# Patient Record
Sex: MALE | Race: BLACK OR AFRICAN AMERICAN
[De-identification: names, ages, dates, MRNs, and addresses within clinical notes are randomized per-mention and may not be internally consistent; named-entity substitution may affect disease eponyms.]

---

## 2018-06-06 ENCOUNTER — HOSPITAL ENCOUNTER (EMERGENCY)
Dept: HOSPITAL 56 - MW.ED | Age: 28
Discharge: HOME | End: 2018-06-06
Payer: COMMERCIAL

## 2018-06-06 DIAGNOSIS — F17.210: ICD-10-CM

## 2018-06-06 DIAGNOSIS — E86.0: ICD-10-CM

## 2018-06-06 DIAGNOSIS — H66.92: Primary | ICD-10-CM

## 2018-06-06 LAB
CHLORIDE SERPL-SCNC: 96 MMOL/L (ref 98–107)
SODIUM SERPL-SCNC: 132 MMOL/L (ref 136–148)

## 2018-06-06 PROCEDURE — 96361 HYDRATE IV INFUSION ADD-ON: CPT

## 2018-06-06 PROCEDURE — 80053 COMPREHEN METABOLIC PANEL: CPT

## 2018-06-06 PROCEDURE — 82150 ASSAY OF AMYLASE: CPT

## 2018-06-06 PROCEDURE — 36415 COLL VENOUS BLD VENIPUNCTURE: CPT

## 2018-06-06 PROCEDURE — 85025 COMPLETE CBC W/AUTO DIFF WBC: CPT

## 2018-06-06 PROCEDURE — 83690 ASSAY OF LIPASE: CPT

## 2018-06-06 PROCEDURE — 96374 THER/PROPH/DIAG INJ IV PUSH: CPT

## 2018-06-06 PROCEDURE — 99283 EMERGENCY DEPT VISIT LOW MDM: CPT

## 2018-06-06 PROCEDURE — 81003 URINALYSIS AUTO W/O SCOPE: CPT

## 2018-06-06 NOTE — EDM.PDOC
ED HPI GENERAL MEDICAL PROBLEM





- General


Chief Complaint: General


Stated Complaint: DEHYDRATION, HIGH BLOOD PRESSURE


Time Seen by Provider: 18 14:05


Source of Information: Reports: Patient


History Limitations: Reports: No Limitations





- History of Present Illness


INITIAL COMMENTS - FREE TEXT/NARRATIVE: 





HISTORY AND PHYSICAL:





History of present illness:


[Patient comes to the emergency room complaining of left ear pain, headache and 

generalized malaise for the past 3 days. He had a cold 6 days ago that he felt 

was improving until his symptoms began to worsen 3 days ago. He is complaining 

of fever, blurred vision, headache, nausea and decreased appetite. He's had 

some mild epigastric discomfort but no vomiting. No change to his bowels or 

bladder. Pain to L ear with swallowing. States that he is otherwise healthy. He 

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 

ago. He has been taking NyQuil for his symptoms which provides minimal 

improvement. He has not taken any other medications.]





Review of systems: 


As per history of present illness and below otherwise all systems reviewed and 

negative.





Past medical history: 


As per history of present illness and as reviewed below otherwise 

noncontributory.





Surgical history: 


As per history of present illness and as reviewed below otherwise 

noncontributory.





Social history: 


No reported history of drug or alcohol abuse.





Family history: 


As per history of present illness and as reviewed below otherwise 

noncontributory.





Physical exam:


HEENT: Atraumatic, normocephalic. Left TM is brightly erythematous with mild 

effusion. Right TM is unremarkable. Oral mucous membranes are pink and moist. 

no tonsillar swelling erythema or exudate. Neck is supple, no lymphadenopathy 

appreciated.


Lungs: Clear to auscultation, breath sounds equal bilaterally, chest nontender. 

No wheezing crackles or rales.


Heart: S1S2, regular, negative for clicks, rubs, or JVD.


Abdomen: Soft, nondistended. Is mildly tender with palpation over the left 

upper quadrant of his abdomen. Negative for masses guarding or rebound. 

Negative for costovertebral tenderness.


Pelvis: Stable nontender.


Genitourinary: Deferred.


Rectal: Deferred.


Extremities: Atraumatic. Full range of motion. Neurovascular unremarkable.


Neuro: Awake, alert, oriented.  Motor and sensory unremarkable throughout. Exam 

nonfocal.





Diagnostics:


[CBC, CMP, urinalysis, amylase, lipase]





Therapeutics:


[1 L normal saline, Toradol 30 mg IV]





Impression: 


[Left otitis media


dehydration]





Plan:


[Labs appear unremarkable other than patient appears dehydrated. Rx written for 

amoxicillin 500mg (#30) si po TID 0 RF's. Pt feels improved following 2 

liters NS. Referral given to follow-up with local PCP. Push fluids, get plenty 

of rest, excuse from work tomorrow due to illness. Vivek alternating with 

ibuprofen as needed.]





Definitive disposition and diagnosis as appropriate pending reevaluation and 

review of above.





  ** behind eyes, headache back


Pain Score (Numeric/FACES): 9





- Related Data


 Allergies











Allergy/AdvReac Type Severity Reaction Status Date / Time


 


No Known Allergies Allergy   Verified 18 13:57











Home Meds: 


 Home Meds





. [No Known Home Meds]  18 [History]











Past Medical History





- Past Health History


Medical/Surgical History: Denies Medical/Surgical History





- Infectious Disease History


Infectious Disease History: Reports: None





Social & Family History





- Family History


Family Medical History: Noncontributory





- Tobacco Use


Smoking Status *Q: Current Every Day Smoker


Years of Tobacco use: 4


Packs/Tins Daily: 0.1





- Recreational Drug Use


Recreational Drug Use: No





ED ROS GENERAL





- Review of Systems


Review Of Systems: ROS reveals no pertinent complaints other than HPI.





ED EXAM, GENERAL





- Physical Exam


Exam: See Below





Course





- Vital Signs


Last Recorded V/S: 


 Last Vital Signs











Temp  98.8 F   18 15:42


 


Pulse  92   18 15:42


 


Resp  14   18 15:42


 


BP  121/61   18 15:42


 


Pulse Ox  98   18 15:42














- Orders/Labs/Meds


Orders: 


 Active Orders 24 hr











 Category Date Time Status


 


 Sodium Chloride 0.9% [Normal Saline] 1,000 ml Med  18 15:39 Active





 IV STAT   








 Medication Orders





Sodium Chloride (Normal Saline)  1,000 mls @ 999 mls/hr IV STAT ONE


   Stop: 18 16:39


   Last Admin: 18 15:40  Dose: 999 mls/hr








Labs: 


 Laboratory Tests











  18 Range/Units





  14:17 14:17 14:20 


 


WBC  6.24    (4.0-11.0)  K/uL


 


RBC  5.12    (4.50-5.90)  M/uL


 


Hgb  15.3    (13.0-17.0)  g/dL


 


Hct  43.4    (38.0-50.0)  %


 


MCV  84.8    (80.0-98.0)  fL


 


MCH  29.9    (27.0-32.0)  pg


 


MCHC  35.3    (31.0-37.0)  g/dL


 


RDW Std Deviation  40.8    (28.0-62.0)  fl


 


RDW Coeff of Robbie  13    (11.0-15.0)  %


 


Plt Count  132 L    (150-400)  K/uL


 


MPV  8.80    (7.40-12.00)  fL


 


Neut % (Auto)  60.7    (48.0-80.0)  %


 


Lymph % (Auto)  32.4    (16.0-40.0)  %


 


Mono % (Auto)  6.4    (0.0-15.0)  %


 


Eos % (Auto)  0.0    (0.0-7.0)  %


 


Baso % (Auto)  0.5    (0.0-1.5)  %


 


Neut # (Auto)  3.8    (1.4-5.7)  K/uL


 


Lymph # (Auto)  2.0    (0.6-2.4)  K/uL


 


Mono # (Auto)  0.4    (0.0-0.8)  K/uL


 


Eos # (Auto)  0.0    (0.0-0.7)  K/uL


 


Baso # (Auto)  0.0    (0.0-0.1)  K/uL


 


Nucleated RBC %  0.0    /100WBC


 


Nucleated RBCs #  0    K/uL


 


Sodium   132 L   (136-148)  mmol/L


 


Potassium   3.1 L   (3.5-5.1)  mmol/L


 


Chloride   96 L   ()  mmol/L


 


Carbon Dioxide   25.4   (21.0-32.0)  mmol/L


 


BUN   11   (7.0-18.0)  mg/dL


 


Creatinine   1.5 H   (0.8-1.3)  mg/dL


 


Est Cr Clr Drug Dosing   73.32   mL/min


 


Estimated GFR (MDRD)   > 60.0   ml/min


 


Glucose   111 H   ()  mg/dL


 


Calcium   8.1 L   (8.5-10.1)  mg/dL


 


Total Bilirubin   0.5   (0.2-1.0)  mg/dL


 


AST   55 H   (15-37)  IU/L


 


ALT   54   (14-63)  IU/L


 


Alkaline Phosphatase   74   ()  U/L


 


Total Protein   7.6   (6.4-8.2)  g/dL


 


Albumin   3.8   (3.4-5.0)  g/dL


 


Globulin   3.8 H   (2.0-3.5)  g/dL


 


Albumin/Globulin Ratio   1.0 L   (1.3-2.8)  


 


Amylase   53   ()  U/L


 


Lipase   146   ()  U/L


 


Urine Color    YELLOW  


 


Urine Appearance    CLEAR  


 


Urine pH    6.0  (5.0-8.0)  


 


Ur Specific Gravity    <= 1.005  (1.001-1.035)  


 


Urine Protein    NEGATIVE  (NEGATIVE)  mg/dL


 


Urine Glucose (UA)    NEGATIVE  (NEGATIVE)  mg/dL


 


Urine Ketones    NEGATIVE  (NEGATIVE)  mg/dL


 


Urine Occult Blood    SMALL H  (NEGATIVE)  


 


Urine Nitrite    NEGATIVE  (NEGATIVE)  


 


Urine Bilirubin    NEGATIVE  (NEGATIVE)  


 


Urine Urobilinogen    0.2  (<2.0)  EU/dL


 


Ur Leukocyte Esterase    NEGATIVE  (NEGATIVE)  











Meds: 


Medications











Generic Name Dose Route Start Last Admin





  Trade Name Freq  PRN Reason Stop Dose Admin


 


Sodium Chloride  1,000 mls @ 999 mls/hr  18 15:39  18 15:40





  Normal Saline  IV  18 16:39  999 mls/hr





  STAT ONE   Administration





     





     





     





     














Discontinued Medications














Generic Name Dose Route Start Last Admin





  Trade Name Freq  PRN Reason Stop Dose Admin


 


Sodium Chloride  1,000 mls @ 999 mls/hr  18 14:15  18 14:23





  Normal Saline  IV  18 15:15  999 mls/hr





  STAT ONE   Administration





     





     





     





     


 


Ketorolac Tromethamine  30 mg  18 14:15  18 14:23





  Toradol  IVPUSH  18 14:16  30 mg





  ONETIME ONE   Administration





     





     





     





     














Departure





- Departure


Time of Disposition: 16:15


Disposition: Home, Self-Care 01


Condition: Good


Clinical Impression: 


 Left otitis media








- Discharge Information


Referrals: 


PCP,None [Primary Care Provider] - 


Forms:  ED Department Discharge


Additional Instructions: 


The following information is given to patients seen in the emergency department 

who are being discharged to home. This information is to outline your options 

for follow-up care. We provide all patients seen in our emergency department 

with a follow-up referral.





The need for follow-up, as well as the timing and circumstances, are variable 

depending upon the specifics of your emergency department visit.





If you don't have a primary care physician on staff, we will provide you with a 

referral. We always advise you to contact your personal physician following an 

emergency department visit to inform them of the circumstance of the visit and 

for follow-up with them and/or the need for any referrals to a consulting 

specialist.





The emergency department will also refer you to a specialist when appropriate. 

This referral assures that you have the opportunity for follow-up care with a 

specialist. All of these measure are taken in an effort to provide you with 

optimal care, which includes your follow-up.





Under all circumstances we always encourage you to contact your private 

physician who remains a resource for coordinating your care. When calling for 

follow-up care, please make the office aware that this follow-up is from your 

recent emergency room visit. If for any reason you are refused follow-up, 

please contact the [facility]  emergency 

department at [phone] and asked to speak to the emergency department 

charge nurse.








[facility]


Primary Care


[address] [place] [id number]


Phone: [phone]


Fax: [phone]








Establish care with a local primary care provider at the clinic listed above in 

follow-up in 48-72 hours.


Push fluids, plenty of rest. Take antibiotics as prescribed. Alternate Tylenol 

with ibuprofen as needed for discomfort or fever.


Return to ER as needed as discussed.





- My Orders


Last 24 Hours: 


My Active Orders





18 15:39


Sodium Chloride 0.9% [Normal Saline] 1,000 ml IV STAT 














- Assessment/Plan


Last 24 Hours: 


My Active Orders





18 15:39


Sodium Chloride 0.9% [Normal Saline] 1,000 ml IV STAT